# Patient Record
(demographics unavailable — no encounter records)

---

## 2025-06-11 NOTE — REASON FOR VISIT
May give another copy of the list of counselors.  She also may want to call her insurance to see who is in the network and whether Medicare will pay for the counseling.  Thank you.   [Follow-Up Visit] : a follow-up visit for [FreeTextEntry2] : restarting bariatric surgery process

## 2025-06-11 NOTE — HISTORY OF PRESENT ILLNESS
[de-identified] :  Ms. CALLES is a 51 year old morbidly obese woman, 5ft 5 inch, 221 lbs, BMI 36.7.  The patient presents requesting weight loss surgery. She has been more than 75 lb. overweight for greater than 5 years.   The patient has tried numerous weight loss programs including self-directed and physician supervised diets and self-directed exercise programs. She has lost greater than 10 pounds on more than one occasion, however, has experienced weight regain despite her best efforts with healthy diet and exercise.  The patient's history includes hypercholesterolemia, obstructive sleep apnea (CPAP therapy) asthma, shortness of breath (undergoing work up)  denies reflux denies smoking

## 2025-06-11 NOTE — ASSESSMENT
[FreeTextEntry1] :  Ms. CALLES is a 51 year old woman with morbid obesity who is unable to lose weight and lower her risk of co-morbid conditions with medical management including diet and exercise. She wishes to proceed with planning for bariatric surgery.

## 2025-06-11 NOTE — HISTORY OF PRESENT ILLNESS
[de-identified] :  Ms. CALLES is a 51 year old morbidly obese woman, 5ft 5 inch, 221 lbs, BMI 36.7.  The patient presents requesting weight loss surgery. She has been more than 75 lb. overweight for greater than 5 years.   The patient has tried numerous weight loss programs including self-directed and physician supervised diets and self-directed exercise programs. She has lost greater than 10 pounds on more than one occasion, however, has experienced weight regain despite her best efforts with healthy diet and exercise.  The patient's history includes hypercholesterolemia, obstructive sleep apnea (CPAP therapy) asthma, shortness of breath (undergoing work up)  denies reflux denies smoking